# Patient Record
Sex: MALE | Race: WHITE | Employment: FULL TIME | URBAN - METROPOLITAN AREA
[De-identification: names, ages, dates, MRNs, and addresses within clinical notes are randomized per-mention and may not be internally consistent; named-entity substitution may affect disease eponyms.]

---

## 2021-11-05 ENCOUNTER — TELEPHONE (OUTPATIENT)
Dept: CARDIOLOGY | Facility: CLINIC | Age: 56
End: 2021-11-05
Payer: COMMERCIAL

## 2021-11-05 NOTE — TELEPHONE ENCOUNTER
"Andrey left a VM stating that he saw Deniz Cadena a few years ago. Pt is requesting a callback from deniz to discuss a new diagnosis that he has received that might have a genetic component to it.     11/11/21 Andrey called to report that he was recently diagnosed with ankylosing spondylitis (AS) and wondered if there were any genetic tests available for this such as HLA.    Explained that this is not something that I typically work with, so I cannot be certain of latest information.  However, I do know that genetic changes in the HLA genes can predispose individuals to AS   The HLA genes are affiliated with the immune system.  Most changes in the HLA genes may alter risk but are not the sole cause for symptoms.     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  It also may be worth offering a new aortopathy panel to look for deletions and duplications within the genes.    I will follow up with Andrey after I hear back from the lab.      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 It has strong evidence in favor of being benign: high frequency in population databases. It was also observed in the homozygous state in an unaffected individual tested at [facility], and in silico analysis supports that this variant does not alter protein structure/function. This classification is up to date.\"    Shared with Andrey.  All questions answered at this time.    Deniz Cadena MS, [facility] [place]  Licensed, Certified Genetic Counselor  [facility]  [facility]         "

## 2021-12-25 ENCOUNTER — HEALTH MAINTENANCE LETTER (OUTPATIENT)
Age: 56
End: 2021-12-25

## 2022-10-22 ENCOUNTER — HEALTH MAINTENANCE LETTER (OUTPATIENT)
Age: 57
End: 2022-10-22

## 2023-04-01 ENCOUNTER — HEALTH MAINTENANCE LETTER (OUTPATIENT)
Age: 58
End: 2023-04-01

## 2024-06-02 ENCOUNTER — HEALTH MAINTENANCE LETTER (OUTPATIENT)
Age: 59
End: 2024-06-02